# Patient Record
Sex: FEMALE | HISPANIC OR LATINO | Employment: UNEMPLOYED | URBAN - METROPOLITAN AREA
[De-identification: names, ages, dates, MRNs, and addresses within clinical notes are randomized per-mention and may not be internally consistent; named-entity substitution may affect disease eponyms.]

---

## 2020-08-26 ENCOUNTER — OFFICE VISIT (OUTPATIENT)
Dept: FAMILY MEDICINE CLINIC | Facility: CLINIC | Age: 35
End: 2020-08-26

## 2020-08-26 VITALS
OXYGEN SATURATION: 99 % | HEIGHT: 68 IN | BODY MASS INDEX: 18.9 KG/M2 | SYSTOLIC BLOOD PRESSURE: 92 MMHG | WEIGHT: 124.7 LBS | DIASTOLIC BLOOD PRESSURE: 70 MMHG | TEMPERATURE: 98.6 F | RESPIRATION RATE: 16 BRPM | HEART RATE: 76 BPM

## 2020-08-26 DIAGNOSIS — Z98.890 HISTORY OF MYOMECTOMY: ICD-10-CM

## 2020-08-26 DIAGNOSIS — Z86.018 HISTORY OF UTERINE FIBROID: Primary | ICD-10-CM

## 2020-08-26 DIAGNOSIS — R10.31 INGUINAL PAIN, RIGHT: ICD-10-CM

## 2020-08-26 PROCEDURE — 99203 OFFICE O/P NEW LOW 30 MIN: CPT | Performed by: FAMILY MEDICINE

## 2020-08-26 PROCEDURE — 99385 PREV VISIT NEW AGE 18-39: CPT | Performed by: FAMILY MEDICINE

## 2020-08-26 RX ORDER — DROSPIRENONE AND ETHINYL ESTRADIOL 0.03MG-3MG
1 KIT ORAL DAILY
Qty: 28 TABLET | Refills: 12 | Status: SHIPPED | OUTPATIENT
Start: 2020-08-26 | End: 2020-08-26 | Stop reason: SDUPTHER

## 2020-08-26 RX ORDER — DROSPIRENONE AND ETHINYL ESTRADIOL 0.03MG-3MG
1 KIT ORAL DAILY
Qty: 28 TABLET | Refills: 12 | Status: SHIPPED | OUTPATIENT
Start: 2020-08-26

## 2020-08-26 NOTE — TELEPHONE ENCOUNTER
Caller: Cara Ashley    Relationship: Self    Best call back number: [phone]       Medication needed:   Requested Prescriptions     Pending Prescriptions Disp Refills   • drospirenone-ethinyl estradiol (Anjali 28) 3-0.03 MG per tablet 28 tablet 12     Sig: Take 1 tablet by mouth Daily.           What details did the patient provide when requesting the medication:  PATIENT SENT THIS TO [facility] AND THEY DO NOT TAKE HER INSURANCE. PLEASE RESEND TO [facility]     Does the patient have less than a 3 day supply:  [x] Yes  [] No    What is the patient's preferred pharmacy: [facility][id number] [place], [place] - [address] AT [facility] [phone] [facility] [phone]

## 2020-08-26 NOTE — PATIENT INSTRUCTIONS
Patient Instructions    Anjali prescribed.  Ob-gyn referral placed.  Ultrasound ordered to evaluate for hernia.  Please send recent lab work to the office- you can send via [employer].  Return to clinic as needed and for routine annual exam.

## 2020-08-26 NOTE — PROGRESS NOTES
Subjective   Cara Ashley is a 35 y.o. female.     Chief Complaint   Patient presents with   • Establish Care   • Contraception   • Chest Pain     LEFT SIDE       History of Present Illness   Cara presents as a new patient today for annual exam and with complaint of right inguinal pain.   She moved to [place] from [place] two years ago.   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 She moved to [place] with her  who is a physicist and had a job offer from [employer].   Needs OCP refilled today.    PMH: Fibroid removed via hysteroscopy 8/2019 in [place]- she was supposed to have a follow up ultrasound but was not able to make it.    Denies other PMH.    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     Lives with  at home.   No children.  She has never been a smoker.   Occasional alcohol use.   Does not exercise regularly, aside from walking.  She is studying for the [employer] step 2 currently.    Mother: HTN  Father: HLD    Last pap was in January- was normal. In [place]. Also had labs done.  Has regular monthly menses.    Her right inguinal pain has been going on intermittently for a few months- it is aggravated by bearing down, she wonders if it is a hernia. She denies hip pain or pain with movement at the hip, denies pain with ambulation. Denies tingling/numbness.     History reviewed. No pertinent past medical history.  History reviewed. No pertinent surgical history.  Social History     Tobacco Use   • Smoking status: Never Smoker   • Smokeless tobacco: Never Used   Substance Use Topics   • Alcohol use: Yes     Alcohol/week: 4.0 standard drinks     Types: 2 Cans of beer, 2 Glasses of wine per week     Comment: TWICE A MONTH   • Drug use: Never     Family History   Problem Relation Age of Onset   • Hypertension Mother    • Hyperlipidemia Father    • No Known Problems Brother        Review of Systems   Constitutional: Negative for appetite change, chills, fever, unexpected weight gain and unexpected weight  "loss.   Respiratory: Negative for cough and shortness of breath.    Cardiovascular: Negative for chest pain.   Gastrointestinal: Negative for abdominal pain, constipation, diarrhea, nausea and vomiting.   Genitourinary: Negative for menstrual problem.   Musculoskeletal: Negative for arthralgias and back pain.   Neurological: Negative for dizziness, numbness and headache.   Psychiatric/Behavioral: Negative for sleep disturbance and depressed mood. The patient is not nervous/anxious.        Objective   BP 92/70   Pulse 76   Temp 98.6 °F (37 °C)   Resp 16   Ht 173 cm (68.11\")   Wt 56.6 kg (124 lb 11.2 oz)   SpO2 99%   BMI 18.90 kg/m²     Physical Exam   Constitutional: She appears well-developed and well-nourished. No distress.   Pleasant female, appears well.    HENT:   Head: Normocephalic.   Right Ear: External ear normal.   Left Ear: External ear normal.   Mouth/Throat: Oropharynx is clear and moist.   Eyes: Pupils are equal, round, and reactive to light. Conjunctivae and EOM are normal.   Neck: Normal range of motion. Neck supple.   Cardiovascular: Normal rate, regular rhythm, normal heart sounds and intact distal pulses.   Pulmonary/Chest: Effort normal and breath sounds normal. No respiratory distress. She has no wheezes. She has no rales.   Abdominal: Soft. Bowel sounds are normal. She exhibits no distension and no mass. There is no tenderness. There is no rebound and no guarding. No hernia (unable to appreciate inguinal/femoral hernia).   Musculoskeletal: Normal range of motion. She exhibits no edema.   Lymphadenopathy:     She has no cervical adenopathy.   Neurological: She is alert.   Skin: Skin is warm and dry. Capillary refill takes less than 2 seconds.   Psychiatric: She has a normal mood and affect.   Vitals reviewed.      Procedures    Assessment/Plan   Cara was seen today for establish care, contraception and chest pain.    Diagnoses and all orders for this visit:    History of uterine fibroid  -  "    Ambulatory Referral to Obstetrics / Gynecology    Inguinal pain, right  -     US abdomen limited; Future    History of myomectomy  -     Ambulatory Referral to Obstetrics / Gynecology    Other orders  -     Discontinue: drospirenone-ethinyl estradiol (Anjali 28) 3-0.03 MG per tablet; Take 1 tablet by mouth Daily.    Cara is a very pleasant 34 y/o F from [place] with history of uterine fibroid who presents today as a new patient with complaint of R inguinal pain.   She appears well today.  She could potentially have inguinal vs femoral hernia. I do not believe her pain is originating from the hip.  Ultrasound ordered to evaluate for hernia.  Anjali prescription refilled.  Ob-gyn referral placed for post- myomectomy monitoring.  She will send recent lab work to me via uchoose for review.  She will return to clinic as needed and for routine annual exam.        Patient Instructions    Anjali prescribed.  Ob-gyn referral placed.  Ultrasound ordered to evaluate for hernia.  Please send recent lab work to the office- you can send via uchoose.  Return to clinic as needed and for routine annual exam.

## 2020-09-02 ENCOUNTER — HOSPITAL ENCOUNTER (OUTPATIENT)
Dept: ULTRASOUND IMAGING | Facility: HOSPITAL | Age: 35
Discharge: HOME | End: 2020-09-02
Admitting: FAMILY MEDICINE

## 2020-09-02 DIAGNOSIS — R10.31 INGUINAL PAIN, RIGHT: ICD-10-CM

## 2020-09-02 PROCEDURE — 76882 US LMTD JT/FCL EVL NVASC XTR: CPT

## 2021-04-16 ENCOUNTER — BULK ORDERING (OUTPATIENT)
Dept: CASE MANAGEMENT | Facility: OTHER | Age: 36
End: 2021-04-16

## 2021-04-16 DIAGNOSIS — Z23 IMMUNIZATION DUE: ICD-10-CM
